# Patient Record
Sex: FEMALE | Race: WHITE | Employment: FULL TIME | URBAN - METROPOLITAN AREA
[De-identification: names, ages, dates, MRNs, and addresses within clinical notes are randomized per-mention and may not be internally consistent; named-entity substitution may affect disease eponyms.]

---

## 2022-07-29 RX ORDER — AMLODIPINE BESYLATE 5 MG/1
5 TABLET ORAL DAILY
COMMUNITY

## 2022-08-01 ENCOUNTER — ANESTHESIA EVENT (OUTPATIENT)
Dept: SURGERY | Facility: HOSPITAL | Age: 42
End: 2022-08-01
Payer: COMMERCIAL

## 2022-08-01 ENCOUNTER — HOSPITAL ENCOUNTER (OUTPATIENT)
Facility: HOSPITAL | Age: 42
Setting detail: HOSPITAL OUTPATIENT SURGERY
Discharge: HOME | End: 2022-08-01
Attending: PODIATRIST | Admitting: PODIATRIST
Payer: COMMERCIAL

## 2022-08-01 ENCOUNTER — ANESTHESIA (OUTPATIENT)
Dept: SURGERY | Facility: HOSPITAL | Age: 42
End: 2022-08-01
Payer: COMMERCIAL

## 2022-08-01 ENCOUNTER — APPOINTMENT (OUTPATIENT)
Dept: GENERAL RADIOLOGY | Facility: HOSPITAL | Age: 42
End: 2022-08-01
Attending: PODIATRIST
Payer: COMMERCIAL

## 2022-08-01 VITALS
TEMPERATURE: 98 F | DIASTOLIC BLOOD PRESSURE: 68 MMHG | WEIGHT: 293 LBS | HEIGHT: 69 IN | SYSTOLIC BLOOD PRESSURE: 119 MMHG | RESPIRATION RATE: 20 BRPM | BODY MASS INDEX: 43.4 KG/M2 | HEART RATE: 89 BPM | OXYGEN SATURATION: 95 %

## 2022-08-01 DIAGNOSIS — Z01.818 PREOP TESTING: Primary | ICD-10-CM

## 2022-08-01 LAB
B-HCG UR QL: NEGATIVE
GLUCOSE BLDC GLUCOMTR-MCNC: 151 MG/DL (ref 70–99)
GLUCOSE BLDC GLUCOMTR-MCNC: 182 MG/DL (ref 70–99)
HCG SERPL QL: NEGATIVE

## 2022-08-01 PROCEDURE — 82962 GLUCOSE BLOOD TEST: CPT

## 2022-08-01 PROCEDURE — 0QSP04Z REPOSITION LEFT METATARSAL WITH INTERNAL FIXATION DEVICE, OPEN APPROACH: ICD-10-PCS | Performed by: PODIATRIST

## 2022-08-01 PROCEDURE — 84703 CHORIONIC GONADOTROPIN ASSAY: CPT | Performed by: PODIATRIST

## 2022-08-01 PROCEDURE — 76000 FLUOROSCOPY <1 HR PHYS/QHP: CPT | Performed by: PODIATRIST

## 2022-08-01 PROCEDURE — 81025 URINE PREGNANCY TEST: CPT

## 2022-08-01 RX ORDER — DEXTROSE MONOHYDRATE 25 G/50ML
50 INJECTION, SOLUTION INTRAVENOUS
Status: DISCONTINUED | OUTPATIENT
Start: 2022-08-01 | End: 2022-08-01

## 2022-08-01 RX ORDER — NICOTINE POLACRILEX 4 MG
30 LOZENGE BUCCAL
Status: DISCONTINUED | OUTPATIENT
Start: 2022-08-01 | End: 2022-08-01

## 2022-08-01 RX ORDER — KETOROLAC TROMETHAMINE 30 MG/ML
INJECTION, SOLUTION INTRAMUSCULAR; INTRAVENOUS AS NEEDED
Status: DISCONTINUED | OUTPATIENT
Start: 2022-08-01 | End: 2022-08-01 | Stop reason: SURG

## 2022-08-01 RX ORDER — METOCLOPRAMIDE 10 MG/1
10 TABLET ORAL ONCE
Status: COMPLETED | OUTPATIENT
Start: 2022-08-01 | End: 2022-08-01

## 2022-08-01 RX ORDER — NALOXONE HYDROCHLORIDE 0.4 MG/ML
80 INJECTION, SOLUTION INTRAMUSCULAR; INTRAVENOUS; SUBCUTANEOUS AS NEEDED
Status: DISCONTINUED | OUTPATIENT
Start: 2022-08-01 | End: 2022-08-01

## 2022-08-01 RX ORDER — MORPHINE SULFATE 4 MG/ML
2 INJECTION, SOLUTION INTRAMUSCULAR; INTRAVENOUS EVERY 10 MIN PRN
Status: DISCONTINUED | OUTPATIENT
Start: 2022-08-01 | End: 2022-08-01

## 2022-08-01 RX ORDER — MORPHINE SULFATE 10 MG/ML
6 INJECTION, SOLUTION INTRAMUSCULAR; INTRAVENOUS EVERY 10 MIN PRN
Status: DISCONTINUED | OUTPATIENT
Start: 2022-08-01 | End: 2022-08-01

## 2022-08-01 RX ORDER — CEFAZOLIN SODIUM IN 0.9 % NACL 3 G/100 ML
3 INTRAVENOUS SOLUTION, PIGGYBACK (ML) INTRAVENOUS ONCE
Status: DISCONTINUED | OUTPATIENT
Start: 2022-08-01 | End: 2022-08-01 | Stop reason: HOSPADM

## 2022-08-01 RX ORDER — MORPHINE SULFATE 4 MG/ML
4 INJECTION, SOLUTION INTRAMUSCULAR; INTRAVENOUS EVERY 10 MIN PRN
Status: DISCONTINUED | OUTPATIENT
Start: 2022-08-01 | End: 2022-08-01

## 2022-08-01 RX ORDER — HYDROMORPHONE HYDROCHLORIDE 1 MG/ML
0.6 INJECTION, SOLUTION INTRAMUSCULAR; INTRAVENOUS; SUBCUTANEOUS EVERY 5 MIN PRN
Status: DISCONTINUED | OUTPATIENT
Start: 2022-08-01 | End: 2022-08-01

## 2022-08-01 RX ORDER — LIDOCAINE HYDROCHLORIDE 10 MG/ML
INJECTION, SOLUTION EPIDURAL; INFILTRATION; INTRACAUDAL; PERINEURAL AS NEEDED
Status: DISCONTINUED | OUTPATIENT
Start: 2022-08-01 | End: 2022-08-01 | Stop reason: SURG

## 2022-08-01 RX ORDER — ACETAMINOPHEN 500 MG
1000 TABLET ORAL ONCE
Status: COMPLETED | OUTPATIENT
Start: 2022-08-01 | End: 2022-08-01

## 2022-08-01 RX ORDER — HYDROMORPHONE HYDROCHLORIDE 1 MG/ML
0.4 INJECTION, SOLUTION INTRAMUSCULAR; INTRAVENOUS; SUBCUTANEOUS EVERY 5 MIN PRN
Status: DISCONTINUED | OUTPATIENT
Start: 2022-08-01 | End: 2022-08-01

## 2022-08-01 RX ORDER — NICOTINE POLACRILEX 4 MG
15 LOZENGE BUCCAL
Status: DISCONTINUED | OUTPATIENT
Start: 2022-08-01 | End: 2022-08-01

## 2022-08-01 RX ORDER — MIDAZOLAM HYDROCHLORIDE 1 MG/ML
INJECTION INTRAMUSCULAR; INTRAVENOUS AS NEEDED
Status: DISCONTINUED | OUTPATIENT
Start: 2022-08-01 | End: 2022-08-01 | Stop reason: SURG

## 2022-08-01 RX ORDER — HYDROMORPHONE HYDROCHLORIDE 1 MG/ML
0.2 INJECTION, SOLUTION INTRAMUSCULAR; INTRAVENOUS; SUBCUTANEOUS EVERY 5 MIN PRN
Status: DISCONTINUED | OUTPATIENT
Start: 2022-08-01 | End: 2022-08-01

## 2022-08-01 RX ORDER — SODIUM CHLORIDE, SODIUM LACTATE, POTASSIUM CHLORIDE, CALCIUM CHLORIDE 600; 310; 30; 20 MG/100ML; MG/100ML; MG/100ML; MG/100ML
INJECTION, SOLUTION INTRAVENOUS CONTINUOUS
Status: DISCONTINUED | OUTPATIENT
Start: 2022-08-01 | End: 2022-08-01

## 2022-08-01 RX ORDER — FAMOTIDINE 20 MG/1
20 TABLET, FILM COATED ORAL ONCE
Status: COMPLETED | OUTPATIENT
Start: 2022-08-01 | End: 2022-08-01

## 2022-08-01 RX ORDER — BUPIVACAINE HYDROCHLORIDE 5 MG/ML
INJECTION, SOLUTION EPIDURAL; INTRACAUDAL AS NEEDED
Status: DISCONTINUED | OUTPATIENT
Start: 2022-08-01 | End: 2022-08-01 | Stop reason: HOSPADM

## 2022-08-01 RX ADMIN — LIDOCAINE HYDROCHLORIDE 30 MG: 10 INJECTION, SOLUTION EPIDURAL; INFILTRATION; INTRACAUDAL; PERINEURAL at 12:26:00

## 2022-08-01 RX ADMIN — MIDAZOLAM HYDROCHLORIDE 2 MG: 1 INJECTION INTRAMUSCULAR; INTRAVENOUS at 12:21:00

## 2022-08-01 RX ADMIN — KETOROLAC TROMETHAMINE 30 MG: 30 INJECTION, SOLUTION INTRAMUSCULAR; INTRAVENOUS at 13:00:00

## 2022-08-01 RX ADMIN — SODIUM CHLORIDE, SODIUM LACTATE, POTASSIUM CHLORIDE, CALCIUM CHLORIDE: 600; 310; 30; 20 INJECTION, SOLUTION INTRAVENOUS at 12:19:00

## 2022-08-01 NOTE — OPERATIVE REPORT
[address] [facility] MAIN OR  Patients Name: Marcia Armstrong  Attending Physician: Biju Ray DPM  Operating Physician: Dwaine Patel DPM  CSN: [id number]     Location:  OR  MRN: [id number]    YOB: 1980  Admission Date: 8/1/2022  Operation Date: 8/1/2022    Operative Report    Pre-Operative Diagnosis: Left foot pain, closed fracture of base of fifth metatarsal bone of left foot at metaphyseal-diaphyseal junction, initial encounter    Post-Operative Diagnosis: Same as above. Indications: Ms. Genesis Padilla is a 71-year-old female who presented to my office as a consultation in regards to her left fifth metatarsal fracture. She rolled her left foot and ankle while exercising and had immediate pain and discomfort. Radiographs of the left foot were obtained demonstrating fracture of the fifth metatarsal at the metaphyseal diaphyseal junction, prior to the injury patient is a very healthy and active. Given the nature of the fracture, surgical intervention was discussed with the patient to maintain her active way of lifestyle. Procedure Performed: Left fifth metatarsal open reduction internal fixation   1. Procedure CPT Code: 06859 ORIF Fifth Metatarsal, LT. Anesthesia: MAC with local    EBL: Minimal less than 5 mL    Complications: None    Description of Procedure: The patient was met in the preoperative holding area. After proper consent was confirmed, the LEFT  lower extremity was marked as the operative site. A local nerve block was performed about the base of the fifth metatarsal consisting of 1% lidocaine plain and 0.5% Marcaine plain in a one-to-one ratio. The patient was then brought to the operating room. Preoperative antibiotics were given. A left calf tourniquet was placed. The patient was positioned on the operating room table in a lateral decubitus position with all bony prominences well padded. The left lower extremity was prepped and draped in the usual sterile fashion.  Operative timeout was performed, confirming the patient's identity, procedure and operative site. Esmarch bandage was used to exsanguinate the left lower extremity and the tourniquet was inflated to 225 mmHg. Procedure CPT Code: 33113 ORIF Fifth Metatarsal, LT. Attention was directed to the lateral aspect of the base of the fifth metatarsal.  Fluoroscopic guidance was used throughout the procedure. The appropriate K wire for the 5.0 mm Beatrice screw was placed by hand and then tapped with a mallet. After this, the power K wire  was used over the K wire and under fluoroscopic guidance the K wire was advanced down the shaft of the fifth metatarsal.  The fracture was linear at the metaphyseal diaphyseal junction of the fifth metatarsal consistent with a Head fracture. After this a incision was made over the K wire with a 15 blade and deepened with a hemostat. The site was drilled and a depth gauge was used to put the appropriate size 5.0 mm [place] cannulated screw. Fluoroscopy once again demonstrated excellent reduction of the fracture and there was excellent compression noted in the screw was placed to 2 finger tightness. This completed the open reduction internal fixation of the fifth metatarsal Head fracture. Fluoroscopic guidance was used throughout the surgery to aid in appropriate placement of the screw as well as to check for the correction of the reduction of the fracture throughout. The skin was closed using 2-0 nylon suture. Sterile dressings were applied. The patient was placed into a  well-padded short leg splint. The tourniquet was deflated. There was excellent capillary refill to all toes. The patient tolerated the procedure, was awakened and taken to PACU in stable condition.     Implants: Beatrice 5.0 mm x 50 mm cannulated screw

## 2022-08-01 NOTE — BRIEF OP NOTE
Pre-Operative Diagnosis: Left foot pain, closed fracture of base of fifth metatarsal bone of left foot at metaphyseal-diaphyseal junction, initial encounter     Post-Operative Diagnosis: Left foot pain, closed fracture of base of fifth metatarsal bone of left foot at metaphyseal-diaphyseal junction, initial encounter      Procedure Performed:   Left fifth metatarsal open reduction internal fixation    Surgeon(s) and Role:     Yani Bridges DPM - Primary    Assistant(s):   Surgical assistant     Surgical Findings: Consistent with diagnosis, left fifth metatarsal fracture at the metaphyseal diaphyseal junction, consistent with a Head fracture.      Specimen: None     Estimated Blood Loss: Minimal less than 10 mL

## 2022-08-01 NOTE — ANESTHESIA POSTPROCEDURE EVALUATION
Patient: Geoffrey Conway    Procedure Summary     Date: 08/01/22 Room / Location: [facility] OR [address] [place] [address] OR    Anesthesia Start: 1634 Anesthesia Stop: 8437    Procedure: Left fifth metatarsal open reduction internal fixation (Left Foot) Diagnosis: (Left foot pain, closed fracture of base of fifth metatarsal bone of left foot at metaphyseal-diaphyseal junction, initial encounter)    Surgeons: Emiliana Arenas DPM Anesthesiologist: Erika Wilder MD    Anesthesia Type: MAC ASA Status: 3          Anesthesia Type: MAC    Vitals Value Taken Time   /74 08/01/22 1311   Temp 97.6 08/01/22 1312   Pulse 89 08/01/22 1311   Resp 19 08/01/22 1311   SpO2 98 % 08/01/22 1311   Vitals shown include unvalidated device data.     [facility] Post Evaluation:   Patient Evaluated in PACU  Patient Participation: complete - patient participated  Level of Consciousness: awake  Pain Score: 0  Pain Management: adequate  Airway Patency:patent  Dental exam unchanged from preop  Yes    Cardiovascular Status: acceptable  Respiratory Status: acceptable  Postoperative Hydration acceptable      Lilian Garcia CRNA  8/1/2022 1:12 PM

## (undated) DEVICE — TRAY SKIN PREP PVP-1

## (undated) DEVICE — GAMMEX® PI HYBRID SIZE 7.5, STERILE POWDER-FREE SURGICAL GLOVE, POLYISOPRENE AND NEOPRENE BLEND: Brand: GAMMEX

## (undated) DEVICE — LOWER EXTREMITY: Brand: MEDLINE INDUSTRIES, INC.

## (undated) DEVICE — SUT ETHILON 2-0 FS 664H

## (undated) DEVICE — NON-ADHERENT STRIPS,OIL EMULSION: Brand: CURITY

## (undated) DEVICE — CANNULATED DRILL BIT JFX: Brand: ASNIS

## (undated) DEVICE — ABDOMINAL PAD: Brand: CURITY

## (undated) DEVICE — FLANGE TIP SMOOTH SUCTION HANDLE

## (undated) DEVICE — SOLUTION  .9 1000ML BTL

## (undated) DEVICE — PROXIMATE SKIN STAPLERS (35 WIDE) CONTAINS 35 STAINLESS STEEL STAPLES (FIXED HEAD): Brand: PROXIMATE

## (undated) DEVICE — UNTHREADED GUIDE WIRE JFX: Brand: ASNIS

## (undated) DEVICE — SUT VICRYL 2-0 CT-1 J945H

## (undated) DEVICE — STRETCH BANDAGE: Brand: CURITY

## (undated) DEVICE — TOWEL SURG OR 17X30IN BLUE

## (undated) DEVICE — SPLINT PRECUT SYNTH 5X30

## (undated) DEVICE — R- MAT TRANSFER PREVALON 39X81